# Patient Record
Sex: MALE | Race: OTHER | HISPANIC OR LATINO | URBAN - METROPOLITAN AREA
[De-identification: names, ages, dates, MRNs, and addresses within clinical notes are randomized per-mention and may not be internally consistent; named-entity substitution may affect disease eponyms.]

---

## 2023-04-24 ENCOUNTER — EMERGENCY (EMERGENCY)
Facility: HOSPITAL | Age: 43
LOS: 1 days | Discharge: HOME | End: 2023-04-24
Attending: EMERGENCY MEDICINE
Payer: COMMERCIAL

## 2023-04-24 VITALS
HEART RATE: 63 BPM | HEIGHT: 66 IN | RESPIRATION RATE: 18 BRPM | DIASTOLIC BLOOD PRESSURE: 85 MMHG | TEMPERATURE: 98 F | WEIGHT: 166.89 LBS | OXYGEN SATURATION: 98 % | SYSTOLIC BLOOD PRESSURE: 135 MMHG

## 2023-04-24 PROCEDURE — 99283 EMERGENCY DEPT VISIT LOW MDM: CPT

## 2023-04-24 PROCEDURE — T1013: CPT

## 2023-04-24 PROCEDURE — 73630 X-RAY EXAM OF FOOT: CPT

## 2023-04-24 PROCEDURE — 99284 EMERGENCY DEPT VISIT MOD MDM: CPT

## 2023-04-24 PROCEDURE — 73630 X-RAY EXAM OF FOOT: CPT | Mod: 26,RT

## 2023-04-24 RX ORDER — IBUPROFEN 200 MG
600 TABLET ORAL ONCE
Refills: 0 | Status: COMPLETED | OUTPATIENT
Start: 2023-04-24 | End: 2023-04-24

## 2023-04-24 RX ADMIN — Medication 600 MILLIGRAM(S): at 17:12

## 2023-04-24 NOTE — ED ADULT TRIAGE NOTE - NS ED TRIAGE AVPU SCALE
Alert-The patient is alert, awake and responds to voice. The patient is oriented to time, place, and person. The triage nurse is able to obtain subjective information. no Not on this admission/no

## 2023-04-24 NOTE — ED STATDOCS - MUSCULOSKELETAL FINDINGS, MLM
r medial distal foot over distal MT 1 and MTP jt 1 ecchymotic min swewlling + ttp nvi/TENDERNESS/motor intact

## 2023-04-24 NOTE — ED ADULT NURSE NOTE - NSIMPLEMENTINTERV_GEN_ALL_ED
Implemented All Universal Safety Interventions:  [place] to call system. Call bell, personal items and telephone within reach. Instruct patient to call for assistance. Room bathroom lighting operational. Non-slip footwear when patient is off stretcher. Physically safe environment: no spills, clutter or unnecessary equipment. Stretcher in lowest position, wheels locked, appropriate side rails in place.

## 2023-04-24 NOTE — ED STATDOCS - PATIENT PORTAL LINK FT
You can access the FollowMyHealth Patient Portal offered by [facility] by registering at the following website: [email or web address]. By joining [facility]’s FollowMyHealth portal, you will also be able to view your health information using other applications (apps) compatible with our system.

## 2023-04-24 NOTE — ED STATDOCS - CLINICAL SUMMARY MEDICAL DECISION MAKING FREE TEXT BOX
pt had blunt heavy object drop on foot at work causing pain  imagining ordered to evaluate for fx  imaging sig no fx or dislocation  pt has a contusion and can be treated conservatively as an outpt with podiatry follow up

## 2023-04-24 NOTE — ED STATDOCS - OBJECTIVE STATEMENT
Pt with R foot pain s/p heavy object at work fell on foot - a door  + distal medial pain over MTP jt 1 and #1 MT

## 2023-04-24 NOTE — ED ADULT NURSE NOTE - OBJECTIVE STATEMENT
Assumed care of pt at 1643 in . Pt A&Ox4 c/o right foot injury after dropping a door on it. Pt denies loss of sensation, Pt denies any other medical complaints. PT denies CP and SOB. RR Even and unlabored.

## 2023-04-24 NOTE — ED ADULT TRIAGE NOTE - CHIEF COMPLAINT QUOTE
pt states a door fell on his right foot. pt states he feels pulsations in his foot. no obvious deformity noted.

## 2023-04-24 NOTE — ED STATDOCS - NSFOLLOWUPINSTRUCTIONS_ED_ALL_ED_FT
Rest and elevate foot  Ice 20 minutes on and off for the next two days  Ibuprofen over the counter- take 2-3 pills every 6 hours with food for pain  follow up with Dr Carrion podiatry(foot specialist) if not feeling better

## 2023-04-24 NOTE — ED STATDOCS - CARE PROVIDER_API CALL
Willie Carrion (DPM)  Podiatric Medicine and Surgery  [address] [place] [address]  Phone: [phone]  Fax: [phone]  Follow Up Time: